# Patient Record
Sex: FEMALE | Race: WHITE | URBAN - METROPOLITAN AREA
[De-identification: names, ages, dates, MRNs, and addresses within clinical notes are randomized per-mention and may not be internally consistent; named-entity substitution may affect disease eponyms.]

---
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
## 2020-05-12 ENCOUNTER — VIRTUAL VISIT (OUTPATIENT)
Dept: INTERNAL MEDICINE CLINIC | Age: 20
End: 2020-05-12

## 2020-05-12 ENCOUNTER — TELEPHONE (OUTPATIENT)
Dept: INTERNAL MEDICINE CLINIC | Age: 20
End: 2020-05-12

## 2020-05-12 DIAGNOSIS — F41.9 ANXIETY: Primary | ICD-10-CM

## 2020-05-12 RX ORDER — ESCITALOPRAM OXALATE 10 MG/1
10 TABLET ORAL DAILY
Qty: 30 TAB | Refills: 1 | Status: SHIPPED | OUTPATIENT
Start: 2020-05-12 | End: 2020-06-08 | Stop reason: SDUPTHER

## 2020-05-12 NOTE — TELEPHONE ENCOUNTER
Reached out to patient to assist w/ scheduling a medication evaluation / follow up appointment per PCP in 1 month.  Patient has been scheduled for 06/09 @ 1:00 - thank you

## 2020-05-12 NOTE — PROGRESS NOTES
HISTORY OF PRESENT ILLNESS  Cony Mejía is a 23 y.o. female who is present, aware, and consenting for real-time synchronous virtual video visit through [employer]. HPI  Anxiety: Not stable. Pt wanted to try and fix it herself but has not been able to relieve her anxiety. She notes school has been hard. Pt thinks she had a panic attack earlier this year the night before a paper was due. She notes that anytime before an exam or paper she is not able to sleep. Pt continues to comply with Xanax very PRN. Pt reports that she has been quarantining at home with her family but will likely be going back to [place] to her apartment there with her friends to quarantine there. Pt reports that her family noticed her anxiety and encouraged her to discuss this today. Her father discussed Lexapro with her. Pt denies formal or frequent exercise. Of note, pt reports that she finished finals last week. Pt does not have any other acute concerns today. Review of Systems   Psychiatric/Behavioral: The patient is nervous/anxious. All other systems reviewed and are negative. Physical Exam  Vitals signs reviewed. Constitutional:       General: She is not in acute distress. Appearance: Normal appearance. She is not ill-appearing, toxic-appearing or diaphoretic. HENT:      Right Ear: Hearing normal.      Left Ear: Hearing normal.      Nose: Nose normal.      Mouth/Throat:      Mouth: Mucous membranes are moist.      Pharynx: Oropharynx is clear. Eyes:      Conjunctiva/sclera: Conjunctivae normal.   Neck:      Musculoskeletal: Normal range of motion. Pulmonary:      Effort: No respiratory distress. Breath sounds: Normal air entry. Musculoskeletal: Normal range of motion. Skin:     General: Skin is warm and dry. Neurological:      General: No focal deficit present. Mental Status: She is alert and oriented to person, place, and time. Mental status is at baseline.    Psychiatric:         Mood and Affect: Mood normal.         Behavior: Behavior normal.         Thought Content: Thought content normal.         Judgment: Judgment normal.         ASSESSMENT and PLAN  Diagnoses and all orders for this visit:    1. Anxiety  Prescribed Lexapro 10 mg. Advised pt to take a half tablet the first 4-5 days then comply with full tablet. Discussed with pt that exercise is a great natural treatment for anxiety, even if she just walks outside for 20-30 minutes a day. Informed pt that the best treatment for chronic daily anxiety would be regular exercise, counseling, and medication all used in conjunction with each other. Discussed the possible benefits of counseling with pt that just to possibly have a better understanding of her mental processing or have someone to talk to. Verbal referral for [place] given, pt defers for now but will discuss this with her family. Encouraged pt to work on self-care over the summer. Advised pt to f/u in a month (or VV). Will continue to monitor for improvements or changes. -     escitalopram oxalate (LEXAPRO) 10 mg tablet; Take 1 Tab by mouth daily. Lab results and schedule of future lab studies reviewed with patient. Reviewed diet, exercise and weight control. Written by Emmanuelle Wong, as dictated by Pat Kwok MD.     Current diagnosis and concerns discussed with pt at length. Understands risks and benefits or current treatment plan and medications and accepts the treatment and medication with any possible risks. Pt asks appropriate questions which were answered. Pt instructed to call with any concerns or problems.

## 2020-06-16 ENCOUNTER — VIRTUAL VISIT (OUTPATIENT)
Dept: INTERNAL MEDICINE CLINIC | Age: 20
End: 2020-06-16

## 2020-06-16 DIAGNOSIS — F41.9 ANXIETY: Primary | ICD-10-CM

## 2020-06-16 RX ORDER — ESCITALOPRAM OXALATE 10 MG/1
10 TABLET ORAL DAILY
Qty: 90 TAB | Refills: 1 | Status: SHIPPED | OUTPATIENT
Start: 2020-06-16 | End: 2020-12-28 | Stop reason: SDUPTHER

## 2020-06-16 NOTE — PROGRESS NOTES
HISTORY OF PRESENT ILLNESS  Isa Paredes is a 23 y.o. female. HPI  Anxiety: Stable, pt continues to comply with Lexapro 10 mg. Pt notes that it took some time for her to feel the effects but that she feels some improvement now. She states that she has not gained any weight due to the Lexapro. Pt does not endorse regularly exercising or maintaining a healthy diet. Pt does not have any acute concerns today. Review of Systems   All other systems reviewed and are negative. Physical Exam  Vitals signs reviewed. Constitutional:       General: She is not in acute distress. Appearance: Normal appearance. She is not ill-appearing, toxic-appearing or diaphoretic. HENT:      Right Ear: Hearing normal.      Left Ear: Hearing normal.      Nose: Nose normal.      Mouth/Throat:      Mouth: Mucous membranes are moist.      Pharynx: Oropharynx is clear. Eyes:      Conjunctiva/sclera: Conjunctivae normal.   Neck:      Musculoskeletal: Normal range of motion. Pulmonary:      Effort: No respiratory distress. Breath sounds: Normal air entry. Musculoskeletal: Normal range of motion. Skin:     General: Skin is warm and dry. Neurological:      General: No focal deficit present. Mental Status: She is alert and oriented to person, place, and time. Mental status is at baseline. Psychiatric:         Mood and Affect: Mood normal.         Behavior: Behavior normal.         Thought Content: Thought content normal.         Judgment: Judgment normal.         ASSESSMENT and PLAN  Diagnoses and all orders for this visit:    1. Anxiety  Encouraged pt to regularly exercise because of the positive effects on mental health. Refilled pt's Lexapro. Will continue to monitor for improvements or changes. -     escitalopram oxalate (LEXAPRO) 10 mg tablet; Take 1 Tab by mouth daily. Lab results and schedule of future lab studies reviewed with patient. Reviewed diet, exercise and weight control.   Over 50% of the 25 minutes face to face with Hiram Silvestre consisted of counseling and/or discussing treatment plans in reference to her anxiety and exercise and need to work that back in to help with her mood. Written by Susan Aguilar, as dictated by Umesh Cohen MD.     Current diagnosis and concerns discussed with pt at length. Understands risks and benefits or current treatment plan and medications and accepts the treatment and medication with any possible risks. Pt asks appropriate questions which were answered. Pt instructed to call with any concerns or problems.

## 2020-07-06 DIAGNOSIS — N92.6 MENSTRUAL DISORDER: ICD-10-CM

## 2020-07-06 RX ORDER — NORETHINDRONE ACETATE AND ETHINYL ESTRADIOL 1.5; 3 MG/1; UG/1
TABLET ORAL
Qty: 84 DOSE PACK | Refills: 2 | Status: SHIPPED | OUTPATIENT
Start: 2020-07-06 | End: 2021-03-16

## 2020-12-28 DIAGNOSIS — F41.9 ANXIETY: ICD-10-CM

## 2020-12-28 RX ORDER — ESCITALOPRAM OXALATE 10 MG/1
10 TABLET ORAL DAILY
Qty: 90 TAB | Refills: 0 | Status: SHIPPED | OUTPATIENT
Start: 2020-12-28 | End: 2021-01-06 | Stop reason: SDUPTHER

## 2021-01-06 ENCOUNTER — OFFICE VISIT (OUTPATIENT)
Dept: INTERNAL MEDICINE CLINIC | Age: 21
End: 2021-01-06

## 2021-01-06 VITALS
HEART RATE: 81 BPM | TEMPERATURE: 97.7 F | RESPIRATION RATE: 20 BRPM | HEIGHT: 68 IN | BODY MASS INDEX: 23.64 KG/M2 | OXYGEN SATURATION: 97 % | SYSTOLIC BLOOD PRESSURE: 116 MMHG | DIASTOLIC BLOOD PRESSURE: 75 MMHG | WEIGHT: 156 LBS

## 2021-01-06 DIAGNOSIS — F41.9 ANXIETY: Primary | ICD-10-CM

## 2021-01-06 DIAGNOSIS — Z20.822 EXPOSURE TO COVID-19 VIRUS: ICD-10-CM

## 2021-01-06 DIAGNOSIS — R53.83 OTHER FATIGUE: ICD-10-CM

## 2021-01-06 PROCEDURE — 99214 OFFICE O/P EST MOD 30 MIN: CPT | Performed by: INTERNAL MEDICINE

## 2021-01-06 RX ORDER — ESCITALOPRAM OXALATE 10 MG/1
10 TABLET ORAL DAILY
Qty: 90 TAB | Refills: 1 | Status: SHIPPED | OUTPATIENT
Start: 2021-01-06 | End: 2021-09-15

## 2021-01-06 NOTE — PROGRESS NOTES
Vashti Lou (: 2000) is a 20 y.o. female, established patient, here for evaluation of the following chief complaint(s):  Medication Evaluation       ASSESSMENT/PLAN:  1. Anxiety  Stable and well-managed. No change in medications. Discussed with pt that I want her to exercise regularly since it helped with mood when she was at school.   -     escitalopram oxalate (LEXAPRO) 10 mg tablet; Take 1 Tab by mouth daily. APPT DUE!, Normal, Disp-90 Tab, R-1    2. Exposure to COVID-19 virus  Ordered COVID immunity labs due to known exposure. Pt understands that the test is not FDA approved, I am not sure about insurance coverage, and there is questionable long-term efficacy of the test due to the ever-changing nature of COVID-19. Waiting on results.   -     SARS-COV-2 AB, IGG; Future  -     SARS-COV-2 AB, IGG; Future    3. Other fatigue  Presumed stable, rechecking labs today. Will continue to monitor for changes or improvements.   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 She notes that she will get the second dose in 2021.     Mood: Stable, pt continues to comply with Lexapro. Pt reports that she has been doing much better in school since starting Lexapro. She notes that she exercised more while at school but stopped when she went home.     Pt reports that she has been feeling more fatigued than normal. She notes that her periods have not been more heavy. She notes that she started feeling more fatigued since coming home.     Review of Systems   Constitutional: Positive for fatigue.   All other systems reviewed and are negative.      Physical Exam  Constitutional:       Appearance: Normal appearance.   HENT:      Right Ear: Tympanic membrane and external ear normal.      Left Ear: Tympanic membrane and external ear normal.       Mouth/Throat:      Mouth: Mucous membranes are moist.      Pharynx: Oropharynx is clear. Cardiovascular:      Rate and Rhythm: Normal rate and regular rhythm. Pulses: Normal pulses. Heart sounds: Normal heart sounds. Pulmonary:      Effort: Pulmonary effort is normal.      Breath sounds: Normal breath sounds. Musculoskeletal: Normal range of motion. Skin:     General: Skin is warm and dry. Neurological:      General: No focal deficit present. Mental Status: She is alert and oriented to person, place, and time. Psychiatric:         Mood and Affect: Mood normal.         Behavior: Behavior normal.               An electronic signature was used to authenticate this note.   -- Micaela Randhawa

## 2021-01-07 LAB
ALBUMIN SERPL-MCNC: 4.5 G/DL (ref 3.9–5)
ALBUMIN/GLOB SERPL: 1.7 {RATIO} (ref 1.2–2.2)
ALP SERPL-CCNC: 45 IU/L (ref 39–117)
ALT SERPL-CCNC: 14 IU/L (ref 0–32)
AST SERPL-CCNC: 15 IU/L (ref 0–40)
BILIRUB SERPL-MCNC: 0.2 MG/DL (ref 0–1.2)
BUN SERPL-MCNC: 8 MG/DL (ref 6–20)
BUN/CREAT SERPL: 10 (ref 9–23)
CALCIUM SERPL-MCNC: 9.9 MG/DL (ref 8.7–10.2)
CHLORIDE SERPL-SCNC: 105 MMOL/L (ref 96–106)
CO2 SERPL-SCNC: 23 MMOL/L (ref 20–29)
CREAT SERPL-MCNC: 0.82 MG/DL (ref 0.57–1)
DIASORIN SARS-COV-2 AB, IGG, RCVG3T: NEGATIVE
ERYTHROCYTE [DISTWIDTH] IN BLOOD BY AUTOMATED COUNT: 13.1 % (ref 11.7–15.4)
GLOBULIN SER CALC-MCNC: 2.7 G/DL (ref 1.5–4.5)
GLUCOSE SERPL-MCNC: 81 MG/DL (ref 65–99)
HCT VFR BLD AUTO: 41.6 % (ref 34–46.6)
HGB BLD-MCNC: 14.4 G/DL (ref 11.1–15.9)
MCH RBC QN AUTO: 30.4 PG (ref 26.6–33)
MCHC RBC AUTO-ENTMCNC: 34.6 G/DL (ref 31.5–35.7)
MCV RBC AUTO: 88 FL (ref 79–97)
PLATELET # BLD AUTO: 239 X10E3/UL (ref 150–450)
POTASSIUM SERPL-SCNC: 4.3 MMOL/L (ref 3.5–5.2)
PROT SERPL-MCNC: 7.2 G/DL (ref 6–8.5)
RBC # BLD AUTO: 4.73 X10E6/UL (ref 3.77–5.28)
SODIUM SERPL-SCNC: 140 MMOL/L (ref 134–144)
TSH SERPL DL<=0.005 MIU/L-ACNC: 1.83 UIU/ML (ref 0.45–4.5)
WBC # BLD AUTO: 5.6 X10E3/UL (ref 3.4–10.8)

## 2021-09-15 DIAGNOSIS — F41.9 ANXIETY: ICD-10-CM

## 2021-09-16 RX ORDER — ESCITALOPRAM OXALATE 10 MG/1
10 TABLET ORAL DAILY
Qty: 90 TABLET | Refills: 0 | Status: SHIPPED | OUTPATIENT
Start: 2021-09-16 | End: 2021-12-27

## 2021-12-27 DIAGNOSIS — F41.9 ANXIETY: ICD-10-CM

## 2021-12-27 RX ORDER — ESCITALOPRAM OXALATE 10 MG/1
TABLET ORAL
Qty: 90 TABLET | Refills: 0 | Status: SHIPPED | OUTPATIENT
Start: 2021-12-27 | End: 2022-03-14

## 2022-03-14 DIAGNOSIS — F41.9 ANXIETY: ICD-10-CM

## 2022-03-14 RX ORDER — ESCITALOPRAM OXALATE 10 MG/1
TABLET ORAL
Qty: 90 TABLET | Refills: 0 | Status: SHIPPED | OUTPATIENT
Start: 2022-03-14 | End: 2022-05-11 | Stop reason: SDUPTHER

## 2022-04-15 DIAGNOSIS — N92.6 MENSTRUAL DISORDER: ICD-10-CM

## 2022-04-15 RX ORDER — NORETHINDRONE ACETATE AND ETHINYL ESTRADIOL 1.5; 3 MG/1; UG/1
TABLET ORAL
Qty: 84 EACH | Refills: 3 | Status: SHIPPED | OUTPATIENT
Start: 2022-04-15 | End: 2022-09-12 | Stop reason: SDUPTHER

## 2022-05-10 NOTE — PROGRESS NOTES
Mala Mcknight (: 2000) is a 24 y.o. female, established patient, here for evaluation of the following chief complaint(s):  Follow-up       ASSESSMENT/PLAN:  Below is the assessment and plan developed based on review of pertinent history, physical exam, labs, studies, and medications. 1. Anxiety  -     escitalopram oxalate (LEXAPRO) 10 mg tablet; TAKE 1 TABLET DAILY, Normal, Disp-90 Tablet, R-0  -     buPROPion XL (WELLBUTRIN XL) 150 mg tablet; Take 1 Tablet by mouth every morning., Normal, Disp-30 Tablet, R-0  Not at goal due to adverse side effects. I will add Wellbutrin to her regimen in hopes of improving her sexual libido and suggested that she decrease her dosage of Lexapro once she has adjusted to her new medication. I recommended alternating between 10 mg and 5 mg for a week, then decreasing to 5 mg. SUBJECTIVE/OBJECTIVE:  HPI    Mood: Pt expresses interest in changing regimens as it has decreased her sex drive and makes her sick when she drinks alcohol. She notes improvement in her anxiety. 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 Review of Systems   All other systems reviewed and are negative. Physical Exam  Constitutional:       Appearance: Normal appearance. HENT:      Right Ear: Tympanic membrane and external ear normal.      Left Ear: Tympanic membrane and external ear normal.      Mouth/Throat:      Mouth: Mucous membranes are moist.      Pharynx: Oropharynx is clear. Cardiovascular:      Rate and Rhythm: Normal rate and regular rhythm. Pulses: Normal pulses. Heart sounds: Normal heart sounds. Pulmonary:      Effort: Pulmonary effort is normal.      Breath sounds: Normal breath sounds. Musculoskeletal:         General: Normal range of motion. Skin:     General: Skin is warm and dry. Neurological:      General: No focal deficit present. Mental Status: She is alert and oriented to person, place, and time. Psychiatric:         Mood and Affect: Mood normal.         Behavior: Behavior normal.       On this date 05/11/2022 I have spent 30 minutes reviewing previous notes, test results and face to face with the patient discussing the diagnosis and importance of compliance with the treatment plan as well as documenting on the day of the visit. An electronic signature was used to authenticate this note. Written by Sekou Greco as dictated by Dr. Pravin Silver.    -- Sekou Greco

## 2022-05-11 ENCOUNTER — OFFICE VISIT (OUTPATIENT)
Dept: INTERNAL MEDICINE CLINIC | Age: 22
End: 2022-05-11
Payer: COMMERCIAL

## 2022-05-11 VITALS
SYSTOLIC BLOOD PRESSURE: 107 MMHG | RESPIRATION RATE: 16 BRPM | DIASTOLIC BLOOD PRESSURE: 71 MMHG | WEIGHT: 166.4 LBS | OXYGEN SATURATION: 97 % | TEMPERATURE: 97.9 F | HEART RATE: 79 BPM | BODY MASS INDEX: 25.22 KG/M2 | HEIGHT: 68 IN

## 2022-05-11 DIAGNOSIS — F41.9 ANXIETY: ICD-10-CM

## 2022-05-11 PROCEDURE — 99214 OFFICE O/P EST MOD 30 MIN: CPT | Performed by: INTERNAL MEDICINE

## 2022-05-11 RX ORDER — ESCITALOPRAM OXALATE 10 MG/1
TABLET ORAL
Qty: 90 TABLET | Refills: 0 | Status: SHIPPED | OUTPATIENT
Start: 2022-05-11 | End: 2022-11-04 | Stop reason: SDUPTHER

## 2022-05-11 RX ORDER — BUPROPION HYDROCHLORIDE 150 MG/1
150 TABLET ORAL
Qty: 30 TABLET | Refills: 0 | Status: SHIPPED | OUTPATIENT
Start: 2022-05-11 | End: 2022-07-09 | Stop reason: SDUPTHER

## 2022-09-12 DIAGNOSIS — N92.6 MENSTRUAL DISORDER: ICD-10-CM

## 2022-09-12 RX ORDER — NORETHINDRONE ACETATE AND ETHINYL ESTRADIOL .03; 1.5 MG/1; MG/1
1 TABLET ORAL DAILY
Qty: 84 EACH | Refills: 3 | Status: SHIPPED | OUTPATIENT
Start: 2022-09-12

## 2022-10-05 DIAGNOSIS — F41.9 ANXIETY: ICD-10-CM

## 2022-10-05 RX ORDER — BUPROPION HYDROCHLORIDE 150 MG/1
150 TABLET ORAL
Qty: 90 TABLET | Refills: 0 | Status: SHIPPED | OUTPATIENT
Start: 2022-10-05

## 2022-11-04 DIAGNOSIS — F41.9 ANXIETY: ICD-10-CM

## 2022-11-04 RX ORDER — ESCITALOPRAM OXALATE 10 MG/1
TABLET ORAL
Qty: 90 TABLET | Refills: 0 | Status: SHIPPED | OUTPATIENT
Start: 2022-11-04

## 2023-01-30 DIAGNOSIS — F41.9 ANXIETY: ICD-10-CM

## 2023-01-30 RX ORDER — ESCITALOPRAM OXALATE 10 MG/1
TABLET ORAL
Qty: 90 TABLET | Refills: 0 | Status: SHIPPED | OUTPATIENT
Start: 2023-01-30

## 2023-05-24 RX ORDER — BUPROPION HYDROCHLORIDE 150 MG/1
1 TABLET ORAL EVERY MORNING
COMMUNITY
Start: 2022-10-05

## 2023-05-24 RX ORDER — ALPRAZOLAM 0.5 MG/1
0.5 TABLET ORAL
COMMUNITY
Start: 2019-11-26

## 2023-05-24 RX ORDER — NORETHINDRONE ACETATE AND ETHINYL ESTRADIOL .03; 1.5 MG/1; MG/1
1 TABLET ORAL DAILY
COMMUNITY
Start: 2022-09-12

## 2023-05-24 RX ORDER — ESCITALOPRAM OXALATE 10 MG/1
1 TABLET ORAL DAILY
COMMUNITY
Start: 2023-01-30